# Patient Record
Sex: FEMALE | Race: OTHER | HISPANIC OR LATINO
[De-identification: names, ages, dates, MRNs, and addresses within clinical notes are randomized per-mention and may not be internally consistent; named-entity substitution may affect disease eponyms.]

---

## 2022-04-27 PROBLEM — Z00.00 ENCOUNTER FOR PREVENTIVE HEALTH EXAMINATION: Status: ACTIVE | Noted: 2022-04-27

## 2022-05-03 ENCOUNTER — APPOINTMENT (OUTPATIENT)
Dept: GASTROENTEROLOGY | Facility: CLINIC | Age: 37
End: 2022-05-03
Payer: MEDICAID

## 2022-05-03 VITALS
RESPIRATION RATE: 14 BRPM | TEMPERATURE: 98.2 F | OXYGEN SATURATION: 98 % | SYSTOLIC BLOOD PRESSURE: 124 MMHG | BODY MASS INDEX: 28.82 KG/M2 | WEIGHT: 173 LBS | HEIGHT: 65 IN | HEART RATE: 80 BPM | DIASTOLIC BLOOD PRESSURE: 78 MMHG

## 2022-05-03 DIAGNOSIS — R10.84 GENERALIZED ABDOMINAL PAIN: ICD-10-CM

## 2022-05-03 PROCEDURE — 99202 OFFICE O/P NEW SF 15 MIN: CPT

## 2022-05-03 NOTE — HISTORY OF PRESENT ILLNESS
[de-identified] : 37-year-old woman with a lifelong history of constipation presented to [facility] approximately a month ago with severe cough.  They did a chest x-ray which incidentally noted what they described to her as a mass in her abdomen.  She is here today with 2 CDs showing an AP and lateral view of her chest and abdomen but no reports explaining these images.\par She notes that she has been constipated all her life but that doctor at [facility] told her that this is not normal and she should see a gastroenterologist for this.\par She reports recent weight gain of approximately 8 pounds in the past 3 weeks.  She denies any blood or mucus in her stool.  Her bowel pattern is that she has a bowel movement every 2 to 3 days associated with straining and incomplete evacuation.  She was pregnant once, the baby was 5 pounds at birth. \par I was unable to locate the report for the chest x-rays but asked her to return to [facility] for this information and come back here at the follow-up appointment with this data.  Additionally, she is going to see her primary care doctor for routine labs, so I added some labs to that list for her to do at the same time next week.\par She also incidentally noted that she has hemorrhoids which occasionally flare and  approximately 4 months ago resulted in a 2-week period of severe discomfort which resolved after some over-the-counter cream and spray were applied applied.  In terms of family history of GI issues,her mom who required hemorrhoid surgery.  Specifically denies any stomach or colon cancer.

## 2022-05-03 NOTE — ASSESSMENT
[FreeTextEntry1] : 37-year-old woman no past medical history here with incidentally noted potentially abnormal radiologic finding in her abdomen

## 2022-05-11 LAB
ALBUMIN SERPL ELPH-MCNC: 4.4 G/DL
ALP BLD-CCNC: 57 U/L
ALT SERPL-CCNC: 21 U/L
ANION GAP SERPL CALC-SCNC: 13 MMOL/L
AST SERPL-CCNC: 16 U/L
BASOPHILS # BLD AUTO: 0.02 K/UL
BASOPHILS NFR BLD AUTO: 0.3 %
BILIRUB SERPL-MCNC: 0.3 MG/DL
BUN SERPL-MCNC: 15 MG/DL
CALCIUM SERPL-MCNC: 9.1 MG/DL
CHLORIDE SERPL-SCNC: 104 MMOL/L
CO2 SERPL-SCNC: 22 MMOL/L
CREAT SERPL-MCNC: 0.55 MG/DL
EGFR: 121 ML/MIN/1.73M2
EOSINOPHIL # BLD AUTO: 0.15 K/UL
EOSINOPHIL NFR BLD AUTO: 2.3 %
FERRITIN SERPL-MCNC: 77 NG/ML
GLUCOSE SERPL-MCNC: 87 MG/DL
HCT VFR BLD CALC: 40.9 %
HGB BLD-MCNC: 13.7 G/DL
IMM GRANULOCYTES NFR BLD AUTO: 0 %
IRON SATN MFR SERPL: 32 %
IRON SERPL-MCNC: 108 UG/DL
LYMPHOCYTES # BLD AUTO: 2.33 K/UL
LYMPHOCYTES NFR BLD AUTO: 35.8 %
MAN DIFF?: NORMAL
MCHC RBC-ENTMCNC: 31.9 PG
MCHC RBC-ENTMCNC: 33.5 GM/DL
MCV RBC AUTO: 95.3 FL
MONOCYTES # BLD AUTO: 0.48 K/UL
MONOCYTES NFR BLD AUTO: 7.4 %
NEUTROPHILS # BLD AUTO: 3.53 K/UL
NEUTROPHILS NFR BLD AUTO: 54.2 %
PLATELET # BLD AUTO: 238 K/UL
POTASSIUM SERPL-SCNC: 4.6 MMOL/L
PROT SERPL-MCNC: 6.7 G/DL
RBC # BLD: 4.29 M/UL
RBC # FLD: 12.4 %
SODIUM SERPL-SCNC: 139 MMOL/L
T4 SERPL-MCNC: 7.5 UG/DL
TIBC SERPL-MCNC: 332 UG/DL
TSH SERPL-ACNC: 0.94 UIU/ML
UIBC SERPL-MCNC: 224 UG/DL
WBC # FLD AUTO: 6.51 K/UL

## 2022-06-04 NOTE — HISTORY OF PRESENT ILLNESS
[de-identified] : Patient was here at the beginning of May concerned about the abnormal finding abdominal x-ray done at [facility] for constipation.  I reviewed the imaging and did not look abnormal to me but I asked her for the official report from jaz MOLINA.  I am awaiting this result.  She had normal labs on May 11 including CBC, CMP, iron stores, thyroid function tests. [de-identified] : 37-year-old woman history of constipation here for follow-up.

## 2022-06-08 ENCOUNTER — APPOINTMENT (OUTPATIENT)
Dept: GASTROENTEROLOGY | Facility: CLINIC | Age: 37
End: 2022-06-08

## 2022-07-25 ENCOUNTER — APPOINTMENT (OUTPATIENT)
Dept: GASTROENTEROLOGY | Facility: CLINIC | Age: 37
End: 2022-07-25

## 2022-07-25 VITALS
WEIGHT: 175 LBS | RESPIRATION RATE: 14 BRPM | HEIGHT: 65 IN | SYSTOLIC BLOOD PRESSURE: 127 MMHG | DIASTOLIC BLOOD PRESSURE: 89 MMHG | TEMPERATURE: 98 F | BODY MASS INDEX: 29.16 KG/M2 | OXYGEN SATURATION: 98 % | HEART RATE: 69 BPM

## 2022-07-25 DIAGNOSIS — R93.89 ABNORMAL FINDINGS ON DIAGNOSTIC IMAGING OF OTHER SPECIFIED BODY STRUCTURES: ICD-10-CM

## 2022-07-25 PROCEDURE — 99214 OFFICE O/P EST MOD 30 MIN: CPT

## 2022-07-25 NOTE — ASSESSMENT
[FreeTextEntry1] : 37-year-old lady no past medical history here in follow-up after an x-ray at [facility] for atypical pneumonia showed what the provider there believed to be a concerning bowel gas pattern.

## 2022-07-25 NOTE — HISTORY OF PRESENT ILLNESS
[de-identified] : 37-year-old lady seen by me initially at the beginning of May here for follow-up.  Had been seen at [facility] for cough, x-ray was taking and there was concern about the appearance of her bowels on CXR. The official report from radiology, which I obtained after her office visit, notes subtle airspace opacity in the left lower lobe concerning for atypical pneumonia. No mention is made of her bowels.\par \par Currently asxatic:\par BM every 1-2 d\par no blood no mucus\par gained weight 7 mo - 6 lb\par occ GERD\par no dysphagia\par triggers eggs, milk, OJ, \par no RUQ pain though triggers more fatty than acidic\par occ post-prandial bloat\par \par

## 2022-08-25 ENCOUNTER — RESULT REVIEW (OUTPATIENT)
Age: 37
End: 2022-08-25

## 2022-08-25 ENCOUNTER — APPOINTMENT (OUTPATIENT)
Dept: DERMATOLOGY | Facility: CLINIC | Age: 37
End: 2022-08-25

## 2022-08-25 PROCEDURE — 99203 OFFICE O/P NEW LOW 30 MIN: CPT | Mod: 25

## 2022-08-25 PROCEDURE — 11102 TANGNTL BX SKIN SINGLE LES: CPT

## 2022-08-25 PROCEDURE — 12031 INTMD RPR S/A/T/EXT 2.5 CM/<: CPT | Mod: 59

## 2022-09-08 ENCOUNTER — APPOINTMENT (OUTPATIENT)
Dept: DERMATOLOGY | Facility: CLINIC | Age: 37
End: 2022-09-08

## 2022-09-08 PROCEDURE — ZZZZZ: CPT

## 2022-10-11 ENCOUNTER — APPOINTMENT (OUTPATIENT)
Dept: DERMATOLOGY | Facility: CLINIC | Age: 37
End: 2022-10-11

## 2022-10-11 PROCEDURE — 99213 OFFICE O/P EST LOW 20 MIN: CPT

## 2022-10-14 ENCOUNTER — APPOINTMENT (OUTPATIENT)
Dept: OBGYN | Facility: CLINIC | Age: 37
End: 2022-10-14

## 2022-10-14 VITALS
BODY MASS INDEX: 29.49 KG/M2 | SYSTOLIC BLOOD PRESSURE: 120 MMHG | HEIGHT: 65 IN | DIASTOLIC BLOOD PRESSURE: 70 MMHG | WEIGHT: 177 LBS

## 2022-10-14 DIAGNOSIS — Z78.9 OTHER SPECIFIED HEALTH STATUS: ICD-10-CM

## 2022-10-14 DIAGNOSIS — Z83.3 FAMILY HISTORY OF DIABETES MELLITUS: ICD-10-CM

## 2022-10-14 DIAGNOSIS — N94.6 DYSMENORRHEA, UNSPECIFIED: ICD-10-CM

## 2022-10-14 DIAGNOSIS — Z82.49 FAMILY HISTORY OF ISCHEMIC HEART DISEASE AND OTHER DISEASES OF THE CIRCULATORY SYSTEM: ICD-10-CM

## 2022-10-14 DIAGNOSIS — Z87.891 PERSONAL HISTORY OF NICOTINE DEPENDENCE: ICD-10-CM

## 2022-10-14 PROCEDURE — 99204 OFFICE O/P NEW MOD 45 MIN: CPT

## 2022-10-14 RX ORDER — MULTIVITAMIN
TABLET ORAL
Refills: 0 | Status: ACTIVE | COMMUNITY

## 2022-10-14 RX ORDER — ETONOGESTREL 68 MG/1
IMPLANT SUBCUTANEOUS
Refills: 0 | Status: ACTIVE | COMMUNITY

## 2022-10-19 PROBLEM — Z87.891 FORMER SMOKER: Status: ACTIVE | Noted: 2022-10-14

## 2022-10-19 PROBLEM — Z78.9 EXERCISES OCCASIONALLY: Status: ACTIVE | Noted: 2022-10-14

## 2022-10-19 PROBLEM — Z83.3 FAMILY HISTORY OF DIABETES MELLITUS: Status: ACTIVE | Noted: 2022-10-14

## 2022-10-19 PROBLEM — Z78.9 CURRENT NON-SMOKER: Status: ACTIVE | Noted: 2022-10-14

## 2022-10-19 PROBLEM — Z82.49 FAMILY HISTORY OF HYPERTENSION: Status: ACTIVE | Noted: 2022-10-14

## 2022-10-19 NOTE — HISTORY OF PRESENT ILLNESS
[HPV test offered] : HPV test offered [N] : Patient reports normal menses [Y] : Positive pregnancy history [Menarche Age: ____] : age at menarche was [unfilled] [Currently Active] : currently active [Men] : men [PapSmeardate] : 06/14/2022 [TextBox_31] : Negative [HPVDate] : 06/14/2022 [TextBox_78] : Negative [LMPDate] : 10/02/2022 [de-identified] : has a Nexplanon (inserted on 2017) [PGxTotal] : 1 [[facility]] : 1 [[facility] Institute[facility]] : 1 [FreeTextEntry1] : 10/02/2022

## 2022-10-19 NOTE — PLAN
[FreeTextEntry1] : Differential diagnosis of dysfunctional uterine bleeding discussed at length including structural, hormonal, and malignant causes. Likely secondary to Nexplanon.\par 1. Hormone panel was drawn today to assess for hormonal causes. We will review lab results at her next visit. \par 2. A pelvic ultrasound was ordered as well. \par 3. We also discussed the need for hysteroscopy with biopsy to R/O structural and malignant causes. The procedure was discussed in detail. She will premedicate with motrin as there is cramping associated with this procedure.\par \par Two weeks after her above work up is completed, she will return to discuss all results and discuss a treatment plan.\par \par Will return for nexplanon removal.

## 2022-10-20 LAB
BASOPHILS # BLD AUTO: 0.03 K/UL
BASOPHILS NFR BLD AUTO: 0.6 %
EOSINOPHIL # BLD AUTO: 0.13 K/UL
EOSINOPHIL NFR BLD AUTO: 2.6 %
FSH SERPL-MCNC: 4.9 IU/L
HCT VFR BLD CALC: 39.4 %
HGB BLD-MCNC: 13.5 G/DL
IMM GRANULOCYTES NFR BLD AUTO: 0 %
LH SERPL-ACNC: 2.2 IU/L
LYMPHOCYTES # BLD AUTO: 1.66 K/UL
LYMPHOCYTES NFR BLD AUTO: 33.5 %
MAN DIFF?: NORMAL
MCHC RBC-ENTMCNC: 31.6 PG
MCHC RBC-ENTMCNC: 34.3 GM/DL
MCV RBC AUTO: 92.3 FL
MONOCYTES # BLD AUTO: 0.49 K/UL
MONOCYTES NFR BLD AUTO: 9.9 %
NEUTROPHILS # BLD AUTO: 2.64 K/UL
NEUTROPHILS NFR BLD AUTO: 53.4 %
PLATELET # BLD AUTO: 248 K/UL
PROLACTIN SERPL-MCNC: 10.3 NG/ML
RBC # BLD: 4.27 M/UL
RBC # FLD: 12.8 %
TSH SERPL-ACNC: 1.07 UIU/ML
WBC # FLD AUTO: 4.95 K/UL

## 2022-10-31 ENCOUNTER — RESULT REVIEW (OUTPATIENT)
Age: 37
End: 2022-10-31

## 2022-10-31 ENCOUNTER — APPOINTMENT (OUTPATIENT)
Dept: DERMATOLOGY | Facility: CLINIC | Age: 37
End: 2022-10-31

## 2022-10-31 PROCEDURE — 11402 EXC TR-EXT B9+MARG 1.1-2 CM: CPT

## 2022-10-31 PROCEDURE — 12032 INTMD RPR S/A/T/EXT 2.6-7.5: CPT

## 2022-10-31 RX ORDER — CEPHALEXIN 500 MG/1
500 TABLET ORAL 3 TIMES DAILY
Qty: 21 | Refills: 0 | Status: ACTIVE | COMMUNITY
Start: 2022-10-31 | End: 1900-01-01

## 2022-11-07 ENCOUNTER — APPOINTMENT (OUTPATIENT)
Dept: OBGYN | Facility: CLINIC | Age: 37
End: 2022-11-07

## 2022-11-07 VITALS
HEIGHT: 65 IN | WEIGHT: 177 LBS | SYSTOLIC BLOOD PRESSURE: 119 MMHG | BODY MASS INDEX: 29.49 KG/M2 | DIASTOLIC BLOOD PRESSURE: 84 MMHG

## 2022-11-07 LAB
HCG UR QL: NEGATIVE
QUALITY CONTROL: YES

## 2022-11-07 PROCEDURE — 11982 REMOVE DRUG IMPLANT DEVICE: CPT

## 2022-11-07 PROCEDURE — 81025 URINE PREGNANCY TEST: CPT

## 2022-11-10 NOTE — DISCUSSION/SUMMARY
[FreeTextEntry1] : Left arm Nexplanon removed in its entirety. \par \par F/u for hyst with EMB secondary to abnormal bleeding.\par \par Will consider Mirena IUD.

## 2022-11-10 NOTE — HISTORY OF PRESENT ILLNESS
[Y] : Positive pregnancy history [Menarche Age: ____] : age at menarche was [unfilled] [No] : Patient does not have concerns regarding sex [PapSmeardate] : 06/14/22 [TextBox_31] : NEG [HPVDate] : 06/14/22 [TextBox_78] : NEG [LMPDate] : 10/10/22 [PGxTotal] : 1 [[facility]Ful[facility]] : 1 [[facility] Cliniciving] : 1 [FreeTextEntry1] : 10/10/22

## 2022-11-10 NOTE — END OF VISIT
no [FreeTextEntry3] : I, Leticia Lenz solely acted as scribe for Dr. Lacie Mcbride on 11/07/2022 \par All medical entries made by the Scribe were at my, Dr. Mcbride’s, direction and personally\par dictated by me on 11/07/2022 . I have reviewed the chart and agree that the record\par accurately reflects my personal performance of the history, physical exam, assessment and plan. I\par have also personally directed, reviewed, and agreed with the chart.

## 2022-11-10 NOTE — PHYSICAL EXAM
[Chaperone Present] : A chaperone was present in the examining room during all aspects of the physical examination [Appropriately responsive] : appropriately responsive [Alert] : alert [No Acute Distress] : no acute distress [Oriented x3] : oriented x3 [FreeTextEntry1] : MOA: Sera LR

## 2022-11-14 ENCOUNTER — APPOINTMENT (OUTPATIENT)
Dept: DERMATOLOGY | Facility: CLINIC | Age: 37
End: 2022-11-14

## 2022-11-14 PROCEDURE — 99213 OFFICE O/P EST LOW 20 MIN: CPT

## 2022-11-14 RX ORDER — DOXYCYCLINE HYCLATE 100 MG/1
100 CAPSULE ORAL
Qty: 14 | Refills: 0 | Status: ACTIVE | COMMUNITY
Start: 2022-11-14 | End: 1900-01-01

## 2022-11-17 ENCOUNTER — NON-APPOINTMENT (OUTPATIENT)
Age: 37
End: 2022-11-17

## 2022-11-17 ENCOUNTER — APPOINTMENT (OUTPATIENT)
Dept: ANTEPARTUM | Facility: CLINIC | Age: 37
End: 2022-11-17

## 2022-11-17 ENCOUNTER — APPOINTMENT (OUTPATIENT)
Dept: OBGYN | Facility: CLINIC | Age: 37
End: 2022-11-17

## 2022-11-17 VITALS
HEIGHT: 65 IN | WEIGHT: 167 LBS | DIASTOLIC BLOOD PRESSURE: 70 MMHG | SYSTOLIC BLOOD PRESSURE: 118 MMHG | BODY MASS INDEX: 27.82 KG/M2

## 2022-11-17 DIAGNOSIS — Z30.46 ENCOUNTER FOR SURVEILLANCE OF IMPLANTABLE SUBDERMAL CONTRACEPTIVE: ICD-10-CM

## 2022-11-17 DIAGNOSIS — Z86.19 PERSONAL HISTORY OF OTHER INFECTIOUS AND PARASITIC DISEASES: ICD-10-CM

## 2022-11-17 DIAGNOSIS — N92.1 EXCESSIVE AND FREQUENT MENSTRUATION WITH IRREGULAR CYCLE: ICD-10-CM

## 2022-11-17 DIAGNOSIS — Z97.5 PRESENCE OF (INTRAUTERINE) CONTRACEPTIVE DEVICE: ICD-10-CM

## 2022-11-17 LAB
HCG UR QL: NEGATIVE
QUALITY CONTROL: YES

## 2022-11-17 PROCEDURE — 81025 URINE PREGNANCY TEST: CPT

## 2022-11-17 PROCEDURE — 58558Z: CUSTOM

## 2022-11-17 RX ORDER — NORETHINDRONE ACETATE AND ETHINYL ESTRADIOL AND FERROUS FUMARATE 1MG-20(21)
1-20 KIT ORAL
Qty: 3 | Refills: 3 | Status: ACTIVE | COMMUNITY
Start: 2022-11-17 | End: 1900-01-01

## 2022-11-17 NOTE — DISCUSSION/SUMMARY
[FreeTextEntry1] : Will call with EMB results.\par \par Treatment options reviewed. She desires birth control pills. Prescription for birth control pills were electronically sent to the pharmacy. She has no contraindications to birth control pill use. Instructions on pill use, precautions, and side effects were discussed in detail. She is aware that the birth control pill is not perfect for preventing pregnancy even it she is compliant with taking the pill and therefore she needs condoms for back up. She was also made aware that the birth control pill does not protect her against sexual transmitted diseases and she still requires condom use. Questions answered.\par \par F/u annually or as needed.

## 2022-11-17 NOTE — END OF VISIT
[FreeTextEntry3] : I, Leticia Lenz solely acted as scribe for Dr. Lacie Mcbride on 11/17/2022 \par All medical entries made by the Scribe were at my, Dr. Mcbride’s, direction and personally\par dictated by me on 11/17/2022 . I have reviewed the chart and agree that the record\par accurately reflects my personal performance of the history, physical exam, assessment and plan. I\par have also personally directed, reviewed, and agreed with the chart.

## 2022-11-17 NOTE — HISTORY OF PRESENT ILLNESS
[HPV test offered] : HPV test offered [Y] : Positive pregnancy history [Menarche Age: ____] : age at menarche was [unfilled] [No] : Patient does not have concerns regarding sex [N] : Patient does not use contraception [Currently Active] : currently active [PapSmeardate] : 6/14/22 [TextBox_31] : neg [HPVDate] : 6/14/22 [TextBox_78] : neg [LMPDate] : 10/21/22 [PGxTotal] : 1 [[facility]] : 1 [[facility]iving] : 1 [FreeTextEntry1] : 10/21/22

## 2022-11-17 NOTE — PHYSICAL EXAM
[Chaperone Present] : A chaperone was present in the examining room during all aspects of the physical examination [Appropriately responsive] : appropriately responsive [Alert] : alert [No Acute Distress] : no acute distress [Oriented x3] : oriented x3 [FreeTextEntry1] : MOA: Kali PERLA

## 2022-11-21 RX ORDER — AMOXICILLIN AND CLAVULANATE POTASSIUM 875; 125 MG/1; MG/1
875-125 TABLET, COATED ORAL
Qty: 14 | Refills: 0 | Status: ACTIVE | COMMUNITY
Start: 2022-11-21 | End: 1900-01-01

## 2022-11-30 ENCOUNTER — APPOINTMENT (OUTPATIENT)
Dept: DERMATOLOGY | Facility: CLINIC | Age: 37
End: 2022-11-30

## 2022-11-30 PROCEDURE — 99213 OFFICE O/P EST LOW 20 MIN: CPT

## 2022-12-02 ENCOUNTER — APPOINTMENT (OUTPATIENT)
Dept: DERMATOLOGY | Facility: CLINIC | Age: 37
End: 2022-12-02

## 2022-12-02 DIAGNOSIS — T81.49XA INFECTION FOLLOWING A PROCEDURE, OTHER SURGICAL SITE, INITIAL ENCOUNTER: ICD-10-CM

## 2022-12-02 DIAGNOSIS — T81.30XA DISRUPTION OF WOUND, UNSPECIFIED, INITIAL ENCOUNTER: ICD-10-CM

## 2022-12-02 DIAGNOSIS — D23.9 OTHER BENIGN NEOPLASM OF SKIN, UNSPECIFIED: ICD-10-CM

## 2022-12-02 PROCEDURE — 99213 OFFICE O/P EST LOW 20 MIN: CPT

## 2022-12-02 RX ORDER — MUPIROCIN 20 MG/G
2 OINTMENT TOPICAL TWICE DAILY
Qty: 1 | Refills: 2 | Status: ACTIVE | COMMUNITY
Start: 2022-12-02 | End: 1900-01-01

## 2022-12-03 PROBLEM — T81.49XA POSTOPERATIVE WOUND INFECTION: Status: ACTIVE | Noted: 2022-11-21

## 2022-12-03 PROBLEM — T81.30XA WOUND DEHISCENCE: Status: ACTIVE | Noted: 2022-12-03

## 2022-12-03 PROBLEM — D23.9 DERMATOFIBROMA: Status: ACTIVE | Noted: 2022-10-31

## 2022-12-20 LAB — CORE LAB BIOPSY: NORMAL

## 2022-12-21 ENCOUNTER — NON-APPOINTMENT (OUTPATIENT)
Age: 37
End: 2022-12-21

## 2023-04-03 ENCOUNTER — OFFICE (OUTPATIENT)
Dept: URBAN - METROPOLITAN AREA CLINIC 94 | Facility: CLINIC | Age: 38
Setting detail: OPHTHALMOLOGY
End: 2023-04-03
Payer: COMMERCIAL

## 2023-04-03 DIAGNOSIS — H43.393: ICD-10-CM

## 2023-04-03 DIAGNOSIS — H16.223: ICD-10-CM

## 2023-04-03 DIAGNOSIS — H40.003: ICD-10-CM

## 2023-04-03 PROCEDURE — 92004 COMPRE OPH EXAM NEW PT 1/>: CPT | Performed by: PHYSICIAN ASSISTANT

## 2023-04-03 PROCEDURE — 92133 CPTRZD OPH DX IMG PST SGM ON: CPT | Performed by: PHYSICIAN ASSISTANT

## 2023-04-03 PROCEDURE — 92250 FUNDUS PHOTOGRAPHY W/I&R: CPT | Performed by: PHYSICIAN ASSISTANT

## 2023-04-03 ASSESSMENT — KERATOMETRY
OD_K1POWER_DIOPTERS: 43.75
OS_K1POWER_DIOPTERS: 43.00
OS_AXISANGLE_DEGREES: 083
OS_K2POWER_DIOPTERS: 44.75
OD_K2POWER_DIOPTERS: 44.50
OD_AXISANGLE_DEGREES: 086

## 2023-04-03 ASSESSMENT — TONOMETRY
OD_IOP_MMHG: 20
OS_IOP_MMHG: 20
OS_IOP_MMHG: 18

## 2023-04-03 ASSESSMENT — PACHYMETRY
OD_CT_CORRECTION: -6
OD_CT_UM: 634
OS_CT_UM: 612
OS_CT_CORRECTION: -5

## 2023-04-03 ASSESSMENT — SUPERFICIAL PUNCTATE KERATITIS (SPK)
OS_SPK: 1+
OD_SPK: 1+

## 2023-04-03 ASSESSMENT — REFRACTION_AUTOREFRACTION
OD_SPHERE: -0.25
OS_AXIS: 167
OD_CYLINDER: -0.50
OD_AXIS: 092
OS_SPHERE: -0.25
OS_CYLINDER: -0.50

## 2023-04-03 ASSESSMENT — AXIALLENGTH_DERIVED
OD_AL: 23.5572
OS_AL: 23.6491

## 2023-04-03 ASSESSMENT — SPHEQUIV_DERIVED
OD_SPHEQUIV: -0.5
OS_SPHEQUIV: -0.5

## 2023-04-03 ASSESSMENT — VISUAL ACUITY
OS_BCVA: 20/20
OD_BCVA: 20/20

## 2023-04-03 ASSESSMENT — CONFRONTATIONAL VISUAL FIELD TEST (CVF)
OD_FINDINGS: FULL
OS_FINDINGS: FULL

## 2023-08-14 ENCOUNTER — OFFICE (OUTPATIENT)
Dept: URBAN - METROPOLITAN AREA CLINIC 94 | Facility: CLINIC | Age: 38
Setting detail: OPHTHALMOLOGY
End: 2023-08-14
Payer: COMMERCIAL

## 2023-08-14 ENCOUNTER — RX ONLY (RX ONLY)
Age: 38
End: 2023-08-14

## 2023-08-14 DIAGNOSIS — H40.003: ICD-10-CM

## 2023-08-14 DIAGNOSIS — H43.393: ICD-10-CM

## 2023-08-14 DIAGNOSIS — H16.223: ICD-10-CM

## 2023-08-14 PROCEDURE — 92083 EXTENDED VISUAL FIELD XM: CPT | Performed by: PHYSICIAN ASSISTANT

## 2023-08-14 PROCEDURE — 92250 FUNDUS PHOTOGRAPHY W/I&R: CPT | Performed by: PHYSICIAN ASSISTANT

## 2023-08-14 PROCEDURE — 99213 OFFICE O/P EST LOW 20 MIN: CPT | Performed by: PHYSICIAN ASSISTANT

## 2023-08-14 ASSESSMENT — VISUAL ACUITY
OD_BCVA: 20/20-1
OS_BCVA: 20/20-1

## 2023-08-14 ASSESSMENT — TONOMETRY
OS_IOP_MMHG: 19
OD_IOP_MMHG: 21
OD_IOP_MMHG: 16
OS_IOP_MMHG: 14

## 2023-08-14 ASSESSMENT — CONFRONTATIONAL VISUAL FIELD TEST (CVF)
OD_FINDINGS: FULL
OS_FINDINGS: FULL

## 2023-08-14 ASSESSMENT — REFRACTION_AUTOREFRACTION
OD_CYLINDER: -0.50
OS_SPHERE: -0.50
OD_AXIS: 095
OD_SPHERE: -0.25
OS_AXIS: 161
OS_CYLINDER: -0.25

## 2023-08-14 ASSESSMENT — PACHYMETRY
OD_CT_UM: 634
OD_CT_CORRECTION: -6
OS_CT_CORRECTION: -5
OS_CT_UM: 612

## 2023-08-14 ASSESSMENT — SPHEQUIV_DERIVED
OD_SPHEQUIV: -0.5
OS_SPHEQUIV: -0.625

## 2023-08-14 ASSESSMENT — AXIALLENGTH_DERIVED
OS_AL: 23.7446
OD_AL: 23.6031

## 2023-08-14 ASSESSMENT — SUPERFICIAL PUNCTATE KERATITIS (SPK)
OD_SPK: 1+
OS_SPK: 1+

## 2023-08-14 ASSESSMENT — KERATOMETRY
OD_K1POWER_DIOPTERS: 43.75
OS_K1POWER_DIOPTERS: 43.00
OS_K2POWER_DIOPTERS: 44.50
OS_AXISANGLE_DEGREES: 084
OD_K2POWER_DIOPTERS: 44.25
OD_AXISANGLE_DEGREES: 087

## 2023-08-29 ENCOUNTER — APPOINTMENT (OUTPATIENT)
Dept: GASTROENTEROLOGY | Facility: CLINIC | Age: 38
End: 2023-08-29

## 2024-04-03 ENCOUNTER — APPOINTMENT (OUTPATIENT)
Dept: OBGYN | Facility: CLINIC | Age: 39
End: 2024-04-03

## 2024-05-08 ENCOUNTER — OFFICE (OUTPATIENT)
Dept: URBAN - METROPOLITAN AREA CLINIC 94 | Facility: CLINIC | Age: 39
Setting detail: OPHTHALMOLOGY
End: 2024-05-08
Payer: COMMERCIAL

## 2024-05-08 DIAGNOSIS — H40.003: ICD-10-CM

## 2024-05-08 PROCEDURE — 92014 COMPRE OPH EXAM EST PT 1/>: CPT | Performed by: OPHTHALMOLOGY

## 2024-05-08 PROCEDURE — 92133 CPTRZD OPH DX IMG PST SGM ON: CPT | Performed by: OPHTHALMOLOGY

## 2024-05-08 ASSESSMENT — CONFRONTATIONAL VISUAL FIELD TEST (CVF)
OS_FINDINGS: FULL
OD_FINDINGS: FULL

## 2024-11-06 ENCOUNTER — OFFICE (OUTPATIENT)
Dept: URBAN - METROPOLITAN AREA CLINIC 94 | Facility: CLINIC | Age: 39
Setting detail: OPHTHALMOLOGY
End: 2024-11-06
Payer: COMMERCIAL

## 2024-11-06 DIAGNOSIS — H40.003: ICD-10-CM

## 2024-11-06 DIAGNOSIS — H16.223: ICD-10-CM

## 2024-11-06 PROCEDURE — 92250 FUNDUS PHOTOGRAPHY W/I&R: CPT | Performed by: PHYSICIAN ASSISTANT

## 2024-11-06 PROCEDURE — 92012 INTRM OPH EXAM EST PATIENT: CPT | Performed by: PHYSICIAN ASSISTANT

## 2024-11-06 PROCEDURE — 92083 EXTENDED VISUAL FIELD XM: CPT | Performed by: PHYSICIAN ASSISTANT

## 2024-11-06 ASSESSMENT — SUPERFICIAL PUNCTATE KERATITIS (SPK)
OS_SPK: 1+
OD_SPK: 1+

## 2024-11-06 ASSESSMENT — CONFRONTATIONAL VISUAL FIELD TEST (CVF)
OS_FINDINGS: FULL
OD_FINDINGS: FULL

## 2024-11-06 ASSESSMENT — PACHYMETRY
OS_CT_UM: 612
OD_CT_CORRECTION: -6
OS_CT_CORRECTION: -5
OD_CT_UM: 634

## 2024-11-06 ASSESSMENT — TONOMETRY
OS_IOP_MMHG: 18
OD_IOP_MMHG: 20

## 2024-11-06 ASSESSMENT — REFRACTION_AUTOREFRACTION
OS_SPHERE: 0.00
OD_AXIS: 099
OS_AXIS: 163
OD_SPHERE: +0.25
OS_CYLINDER: -0.25
OD_CYLINDER: -0.75

## 2024-11-06 ASSESSMENT — KERATOMETRY
OD_K1POWER_DIOPTERS: 43.75
OS_K2POWER_DIOPTERS: 44.50
OD_AXISANGLE_DEGREES: 085
OS_AXISANGLE_DEGREES: 082
OD_K2POWER_DIOPTERS: 44.00
OS_K1POWER_DIOPTERS: 43.00

## 2024-11-06 ASSESSMENT — VISUAL ACUITY
OD_BCVA: 20/20
OS_BCVA: 20/25-

## 2025-05-07 ENCOUNTER — OFFICE (OUTPATIENT)
Dept: URBAN - METROPOLITAN AREA CLINIC 94 | Facility: CLINIC | Age: 40
Setting detail: OPHTHALMOLOGY
End: 2025-05-07
Payer: COMMERCIAL

## 2025-05-07 DIAGNOSIS — H40.003: ICD-10-CM

## 2025-05-07 DIAGNOSIS — H16.223: ICD-10-CM

## 2025-05-07 PROCEDURE — 92014 COMPRE OPH EXAM EST PT 1/>: CPT | Performed by: PHYSICIAN ASSISTANT

## 2025-05-07 PROCEDURE — 92133 CPTRZD OPH DX IMG PST SGM ON: CPT | Performed by: PHYSICIAN ASSISTANT

## 2025-05-07 ASSESSMENT — KERATOMETRY
OS_AXISANGLE_DEGREES: 085
OS_K2POWER_DIOPTERS: 44.50
OD_AXISANGLE_DEGREES: 080
OD_K1POWER_DIOPTERS: 43.75
OD_K2POWER_DIOPTERS: 44.25
OS_K1POWER_DIOPTERS: 43.00

## 2025-05-07 ASSESSMENT — SUPERFICIAL PUNCTATE KERATITIS (SPK)
OS_SPK: 1+
OD_SPK: 1+

## 2025-05-07 ASSESSMENT — REFRACTION_AUTOREFRACTION
OS_AXIS: 165
OD_AXIS: 105
OS_SPHERE: -0.50
OD_SPHERE: 0.00
OS_CYLINDER: -0.25
OD_CYLINDER: -0.50

## 2025-05-07 ASSESSMENT — VISUAL ACUITY
OD_BCVA: 20/25
OS_BCVA: 20/30

## 2025-05-07 ASSESSMENT — PACHYMETRY
OD_CT_UM: 634
OS_CT_CORRECTION: -5
OS_CT_UM: 612
OD_CT_CORRECTION: -6

## 2025-05-07 ASSESSMENT — TONOMETRY
OS_IOP_MMHG: 16
OS_IOP_MMHG: 17
OD_IOP_MMHG: 15